# Patient Record
Sex: MALE | Race: OTHER | HISPANIC OR LATINO | Employment: PART TIME | URBAN - METROPOLITAN AREA
[De-identification: names, ages, dates, MRNs, and addresses within clinical notes are randomized per-mention and may not be internally consistent; named-entity substitution may affect disease eponyms.]

---

## 2022-12-16 ENCOUNTER — APPOINTMENT (EMERGENCY)
Dept: CT IMAGING | Facility: HOSPITAL | Age: 37
End: 2022-12-16

## 2022-12-16 ENCOUNTER — HOSPITAL ENCOUNTER (EMERGENCY)
Facility: HOSPITAL | Age: 37
Discharge: HOME | End: 2022-12-16
Attending: EMERGENCY MEDICINE

## 2022-12-16 ENCOUNTER — APPOINTMENT (EMERGENCY)
Dept: RADIOLOGY | Facility: HOSPITAL | Age: 37
End: 2022-12-16

## 2022-12-16 VITALS
WEIGHT: 180 LBS | SYSTOLIC BLOOD PRESSURE: 140 MMHG | OXYGEN SATURATION: 96 % | TEMPERATURE: 98 F | HEART RATE: 90 BPM | HEIGHT: 65 IN | BODY MASS INDEX: 29.99 KG/M2 | DIASTOLIC BLOOD PRESSURE: 76 MMHG | RESPIRATION RATE: 20 BRPM

## 2022-12-16 DIAGNOSIS — J84.10 CALCIFIED GRANULOMA OF LUNG (HCC): ICD-10-CM

## 2022-12-16 DIAGNOSIS — K76.0 HEPATIC STEATOSIS: ICD-10-CM

## 2022-12-16 DIAGNOSIS — F10.10 ALCOHOL ABUSE: Primary | ICD-10-CM

## 2022-12-16 LAB
2HR DELTA HS TROPONIN: 2 NG/L
ALBUMIN SERPL BCP-MCNC: 4 G/DL (ref 3.5–5)
ALP SERPL-CCNC: 103 U/L (ref 46–116)
ALT SERPL W P-5'-P-CCNC: 49 U/L (ref 12–78)
ANION GAP SERPL CALCULATED.3IONS-SCNC: 21 MMOL/L (ref 4–13)
AST SERPL W P-5'-P-CCNC: 39 U/L (ref 5–45)
ATRIAL RATE: 92 BPM
BASE EXCESS BLDA CALC-SCNC: -3 MMOL/L (ref -2–3)
BASOPHILS # BLD AUTO: 0.03 THOUSANDS/ÂΜL (ref 0–0.1)
BASOPHILS NFR BLD AUTO: 0 % (ref 0–1)
BILIRUB SERPL-MCNC: 1.1 MG/DL (ref 0.2–1)
BUN SERPL-MCNC: 20 MG/DL (ref 5–25)
CA-I BLD-SCNC: 1.06 MMOL/L (ref 1.12–1.32)
CALCIUM SERPL-MCNC: 9 MG/DL (ref 8.3–10.1)
CARDIAC TROPONIN I PNL SERPL HS: 6 NG/L
CARDIAC TROPONIN I PNL SERPL HS: 8 NG/L
CHLORIDE SERPL-SCNC: 89 MMOL/L (ref 96–108)
CO2 SERPL-SCNC: 21 MMOL/L (ref 21–32)
CREAT SERPL-MCNC: 1.07 MG/DL (ref 0.6–1.3)
EOSINOPHIL # BLD AUTO: 0 THOUSAND/ÂΜL (ref 0–0.61)
EOSINOPHIL NFR BLD AUTO: 0 % (ref 0–6)
ERYTHROCYTE [DISTWIDTH] IN BLOOD BY AUTOMATED COUNT: 11.5 % (ref 11.6–15.1)
GFR SERPL CREATININE-BSD FRML MDRD: 87 ML/MIN/1.73SQ M
GLUCOSE SERPL-MCNC: 106 MG/DL (ref 65–140)
GLUCOSE SERPL-MCNC: 112 MG/DL (ref 65–140)
HCO3 BLDA-SCNC: 19.6 MMOL/L (ref 24–30)
HCT VFR BLD AUTO: 48.1 % (ref 36.5–49.3)
HCT VFR BLD CALC: 48 % (ref 36.5–49.3)
HGB BLD-MCNC: 17.1 G/DL (ref 12–17)
HGB BLDA-MCNC: 16.3 G/DL (ref 12–17)
IMM GRANULOCYTES # BLD AUTO: 0.02 THOUSAND/UL (ref 0–0.2)
IMM GRANULOCYTES NFR BLD AUTO: 0 % (ref 0–2)
LIPASE SERPL-CCNC: 122 U/L (ref 73–393)
LYMPHOCYTES # BLD AUTO: 1.22 THOUSANDS/ÂΜL (ref 0.6–4.47)
LYMPHOCYTES NFR BLD AUTO: 15 % (ref 14–44)
MCH RBC QN AUTO: 30.9 PG (ref 26.8–34.3)
MCHC RBC AUTO-ENTMCNC: 35.6 G/DL (ref 31.4–37.4)
MCV RBC AUTO: 87 FL (ref 82–98)
MONOCYTES # BLD AUTO: 0.62 THOUSAND/ÂΜL (ref 0.17–1.22)
MONOCYTES NFR BLD AUTO: 7 % (ref 4–12)
NEUTROPHILS # BLD AUTO: 6.51 THOUSANDS/ÂΜL (ref 1.85–7.62)
NEUTS SEG NFR BLD AUTO: 78 % (ref 43–75)
NRBC BLD AUTO-RTO: 0 /100 WBCS
P AXIS: 44 DEGREES
PCO2 BLD: 20 MMOL/L (ref 21–32)
PCO2 BLD: 30 MM HG (ref 42–50)
PH BLD: 7.42 [PH] (ref 7.3–7.4)
PLATELET # BLD AUTO: 286 THOUSANDS/UL (ref 149–390)
PMV BLD AUTO: 8.8 FL (ref 8.9–12.7)
PO2 BLD: 91 MM HG (ref 35–45)
POTASSIUM BLD-SCNC: 3.1 MMOL/L (ref 3.5–5.3)
POTASSIUM SERPL-SCNC: 3.2 MMOL/L (ref 3.5–5.3)
PR INTERVAL: 156 MS
PROT SERPL-MCNC: 8.9 G/DL (ref 6.4–8.4)
QRS AXIS: 77 DEGREES
QRSD INTERVAL: 106 MS
QT INTERVAL: 364 MS
QTC INTERVAL: 450 MS
RBC # BLD AUTO: 5.53 MILLION/UL (ref 3.88–5.62)
SAO2 % BLD FROM PO2: 97 % (ref 60–85)
SODIUM BLD-SCNC: 129 MMOL/L (ref 136–145)
SODIUM SERPL-SCNC: 131 MMOL/L (ref 135–147)
SPECIMEN SOURCE: ABNORMAL
T WAVE AXIS: 48 DEGREES
VENTRICULAR RATE: 92 BPM
WBC # BLD AUTO: 8.4 THOUSAND/UL (ref 4.31–10.16)

## 2022-12-16 RX ORDER — ONDANSETRON 2 MG/ML
4 INJECTION INTRAMUSCULAR; INTRAVENOUS ONCE
Status: COMPLETED | OUTPATIENT
Start: 2022-12-16 | End: 2022-12-16

## 2022-12-16 RX ADMIN — SODIUM CHLORIDE 1000 ML: 0.9 INJECTION, SOLUTION INTRAVENOUS at 08:39

## 2022-12-16 RX ADMIN — IOHEXOL 100 ML: 350 INJECTION, SOLUTION INTRAVENOUS at 08:55

## 2022-12-16 RX ADMIN — ONDANSETRON 4 MG: 2 INJECTION INTRAMUSCULAR; INTRAVENOUS at 08:39

## 2022-12-16 NOTE — ED PROVIDER NOTES
History  Chief Complaint   Patient presents with   • Alcohol Problem     Pt to er with cousin with reports of drinking too much for a "while", and feels like his body is not strong  Patient states he drinks a "big bottle of liquor each day"  Not requesting inpatient detox  44year old male presents for evaluation of feeling nausea with vomiting and epigastric pain  Patient reports drinking alcohol daily and feels dehydrated  Denies chest pain or SOB  Last drink was last night around midnight  Denies wanting help for detox or rehab at this time but came to the ED due to feeling dehydrated  None       History reviewed  No pertinent past medical history  History reviewed  No pertinent surgical history  History reviewed  No pertinent family history  I have reviewed and agree with the history as documented  E-Cigarette/Vaping     E-Cigarette/Vaping Substances     Social History     Tobacco Use   • Smoking status: Never   • Smokeless tobacco: Never   Substance Use Topics   • Alcohol use: Yes     Comment: daily   • Drug use: Never       Review of Systems   Constitutional: Negative for fever  Gastrointestinal: Positive for abdominal pain  All other systems reviewed and are negative  Physical Exam  Physical Exam  Vitals and nursing note reviewed  Constitutional:       General: He is not in acute distress  Appearance: He is well-developed  HENT:      Head: Normocephalic and atraumatic  Right Ear: External ear normal       Left Ear: External ear normal       Nose: Nose normal    Eyes:      General: No scleral icterus  Pulmonary:      Effort: Pulmonary effort is normal  No respiratory distress  Abdominal:      General: There is no distension  Palpations: Abdomen is soft  Tenderness: There is abdominal tenderness  Comments: Mildly tender epigastric region   Musculoskeletal:         General: No deformity  Normal range of motion        Cervical back: Normal range of motion and neck supple  Skin:     General: Skin is warm  Findings: No rash  Neurological:      General: No focal deficit present  Mental Status: He is alert        Gait: Gait normal    Psychiatric:         Mood and Affect: Mood normal          Vital Signs  ED Triage Vitals   Temperature Pulse Respirations Blood Pressure SpO2   12/16/22 0736 12/16/22 0737 12/16/22 0737 12/16/22 0737 12/16/22 0737   98 °F (36 7 °C) (!) 111 18 (!) 181/99 97 %      Temp Source Heart Rate Source Patient Position - Orthostatic VS BP Location FiO2 (%)   12/16/22 0736 12/16/22 0737 -- -- --   Oral Monitor         Pain Score       --                  Vitals:    12/16/22 0900 12/16/22 1000 12/16/22 1100 12/16/22 1200   BP: 138/72 117/57 138/65 140/76   Pulse: 95 90 87 90         Visual Acuity      ED Medications  Medications   ondansetron (ZOFRAN) injection 4 mg (4 mg Intravenous Given 12/16/22 0839)   sodium chloride 0 9 % bolus 1,000 mL (0 mL Intravenous Stopped 12/16/22 1013)   iohexol (OMNIPAQUE) 350 MG/ML injection (MULTI-DOSE) 100 mL (100 mL Intravenous Given 12/16/22 0855)       Diagnostic Studies  Results Reviewed     Procedure Component Value Units Date/Time    HS Troponin I 2hr [[id number]]  (Normal) Collected: 12/16/22 1105    Lab Status: Final result Specimen: Blood from Arm, Left Updated: 12/16/22 1132     hs TnI 2hr 8 ng/L      Delta 2hr hsTnI 2 ng/L     POCT Blood Gas (CG8+) [[id number]]  (Abnormal) Collected: 12/16/22 0854    Lab Status: Final result Specimen: Venous Updated: 12/16/22 0928     ph, Dieter ISTAT 7 423     pCO2, Dieter i-STAT 30 0 mm HG      pO2, Dieter i-STAT 91 0 mm HG      BE, i-STAT -3 mmol/L      HCO3, Dieter i-STAT 19 6 mmol/L      CO2, i-STAT 20 mmol/L      O2 Sat, i-STAT 97 %      SODIUM, I-STAT 129 mmol/l      Potassium, i-STAT 3 1 mmol/L      Calcium, Ionized i-STAT 1 06 mmol/L      Hct, i-STAT 48 %      Hgb, i-STAT 16 3 g/dl      Glucose, i-STAT 112 mg/dl      Specimen Type VENOUS    HS Troponin 0hr (reflex protocol) [[id number]]  (Normal) Collected: 12/16/22 0839    Lab Status: Final result Specimen: Blood from Arm, Left Updated: 12/16/22 0914     hs TnI 0hr 6 ng/L     CMP [[id number]]  (Abnormal) Collected: 12/16/22 0839    Lab Status: Final result Specimen: Blood from Arm, Left Updated: 12/16/22 0903     Sodium 131 mmol/L      Potassium 3 2 mmol/L      Chloride 89 mmol/L      CO2 21 mmol/L      ANION GAP 21 mmol/L      BUN 20 mg/dL      Creatinine 1 07 mg/dL      Glucose 106 mg/dL      Calcium 9 0 mg/dL      AST 39 U/L      ALT 49 U/L      Alkaline Phosphatase 103 U/L      Total Protein 8 9 g/dL      Albumin 4 0 g/dL      Total Bilirubin 1 10 mg/dL      eGFR 87 ml/min/1 73sq m     Narrative:      [place] guidelines for Chronic Kidney Disease (CKD):   •  Stage 1 with normal or high GFR (GFR > 90 mL/min/1 73 square meters)  •  Stage 2 Mild CKD (GFR = 60-89 mL/min/1 73 square meters)  •  Stage 3A Moderate CKD (GFR = 45-59 mL/min/1 73 square meters)  •  Stage 3B Moderate CKD (GFR = 30-44 mL/min/1 73 square meters)  •  Stage 4 Severe CKD (GFR = 15-29 mL/min/1 73 square meters)  •  Stage 5 End Stage CKD (GFR <15 mL/min/1 73 square meters)  Note: GFR calculation is accurate only with a steady state creatinine    Lipase [[id number]]  (Normal) Collected: 12/16/22 0839    Lab Status: Final result Specimen: Blood from Arm, Left Updated: 12/16/22 0903     Lipase 122 u/L     CBC and differential [[id number]]  (Abnormal) Collected: 12/16/22 0839    Lab Status: Final result Specimen: Blood from Arm, Left Updated: 12/16/22 0851     WBC 8 40 Thousand/uL      RBC 5 53 Million/uL      Hemoglobin 17 1 g/dL      Hematocrit 48 1 %      MCV 87 fL      MCH 30 9 pg      MCHC 35 6 g/dL      RDW 11 5 %      MPV 8 8 fL      Platelets 954 Thousands/uL      nRBC 0 /100 WBCs      Neutrophils Relative 78 %      Immat GRANS % 0 %      Lymphocytes Relative 15 %      Monocytes Relative 7 %      Eosinophils Relative 0 % Basophils Relative 0 %      Neutrophils Absolute 6 51 Thousands/µL      Immature Grans Absolute 0 02 Thousand/uL      Lymphocytes Absolute 1 22 Thousands/µL      Monocytes Absolute 0 62 Thousand/µL      Eosinophils Absolute 0 00 Thousand/µL      Basophils Absolute 0 03 Thousands/µL                  CT Abdomen pelvis with contrast   Final Result by Zaire Quinones MD (02/36 1070)      1  Hepatic steatosis  2   Otherwise unremarkable CT of the abdomen and pelvis  Workstation performed: [id number]         X-ray chest 1 view portable   Final Result by Tam Albert MD (92/72 [id number])      No acute cardiopulmonary disease  Workstation performed: [id number]                    Procedures  Procedures         ED Course                                             MDM  Number of Diagnoses or Management Options  Alcohol abuse: new and requires workup  Calcified granuloma of lung ([facility] [address] )  Hepatic steatosis  Diagnosis management comments: 44 yom with abd pain and vomiting  Obtain labs, fluids  CTAP  Symptom control  Upon reassessment, patient with significant improvement of symptoms  Discussed all results  Fu PCP  RTED          Amount and/or Complexity of Data Reviewed  Clinical lab tests: reviewed and ordered  Tests in the radiology section of CPT®: reviewed and ordered  Tests in the medicine section of CPT®: ordered and reviewed  Decide to obtain previous medical records or to obtain history from someone other than the patient: yes  Review and summarize past medical records: yes        Disposition  Final diagnoses:   Alcohol abuse   Calcified granuloma of lung ([facility] [address] )   Hepatic steatosis     Time reflects when diagnosis was documented in both MDM as applicable and the Disposition within this note     Time User Action Codes Description Comment    12/16/2022 11:55 AM Ladoris Phoenix Add [F10 10] Alcohol abuse     12/16/2022 11:56 AM Ladoris Phoenix Add [J84 10] Calcified granuloma of lung ([facility] [address] )     12/16/2022 11:56 AM Lamont Verde Add [K76 0] Hepatic steatosis       ED Disposition     ED Disposition   Discharge    Condition   Stable    Date/Time   Fri Dec 16, 2022 11:55 AM    Janet Miller discharge to home/self care  Follow-up Information     Follow up With Specialties Details Why Contact Info Additional Information    Your primary care provider          Suzy Morrison [address] [facility] Emergency Medicine  If symptoms worsen [address] [place],[address] [id number]  [address] [facility], [address] [place] [address], [facility] [address]          There are no discharge medications for this patient  No discharge procedures on file      PDMP Review     None          ED Provider  Electronically Signed by           Linda Mcgill DO  12/16/22 6837